# Patient Record
Sex: MALE | Race: WHITE | NOT HISPANIC OR LATINO | Employment: OTHER | URBAN - METROPOLITAN AREA
[De-identification: names, ages, dates, MRNs, and addresses within clinical notes are randomized per-mention and may not be internally consistent; named-entity substitution may affect disease eponyms.]

---

## 2018-01-01 ENCOUNTER — TELEPHONE (OUTPATIENT)
Dept: NEUROLOGY | Facility: HOSPITAL | Age: 68
End: 2018-01-01

## 2018-01-01 ENCOUNTER — CONFERENCE (OUTPATIENT)
Dept: NEUROLOGY | Facility: HOSPITAL | Age: 68
End: 2018-01-01

## 2018-01-01 ENCOUNTER — OFFICE VISIT (OUTPATIENT)
Dept: NEUROLOGY | Facility: HOSPITAL | Age: 68
End: 2018-01-01
Attending: INTERNAL MEDICINE
Payer: COMMERCIAL

## 2018-01-01 ENCOUNTER — LAB VISIT (OUTPATIENT)
Dept: LAB | Facility: HOSPITAL | Age: 68
End: 2018-01-01
Attending: INTERNAL MEDICINE
Payer: MEDICARE

## 2018-01-01 VITALS
HEART RATE: 86 BPM | WEIGHT: 269.38 LBS | SYSTOLIC BLOOD PRESSURE: 155 MMHG | DIASTOLIC BLOOD PRESSURE: 64 MMHG | HEIGHT: 72 IN | OXYGEN SATURATION: 98 % | BODY MASS INDEX: 36.49 KG/M2 | TEMPERATURE: 97 F

## 2018-01-01 DIAGNOSIS — C7A.019 MALIGNANT CARCINOID TUMOR OF SMALL INTESTINE: ICD-10-CM

## 2018-01-01 DIAGNOSIS — C7A.8 NEUROENDOCRINE CARCINOMA OF SMALL BOWEL: Primary | ICD-10-CM

## 2018-01-01 DIAGNOSIS — C7B.8 SECONDARY NEUROENDOCRINE TUMOR OF BONE: ICD-10-CM

## 2018-01-01 DIAGNOSIS — C7A.8 NEUROENDOCRINE CARCINOMA OF SMALL BOWEL: ICD-10-CM

## 2018-01-01 DIAGNOSIS — C7B.8 SECONDARY NEUROENDOCRINE TUMOR OF LIVER: ICD-10-CM

## 2018-01-01 LAB
EXT 24 HR UR METANEPHRINE: ABNORMAL
EXT 24 HR UR METANEPHRINE: NORMAL
EXT 24 HR UR NORMETANEPHRINE: ABNORMAL
EXT 24 HR UR NORMETANEPHRINE: ABNORMAL
EXT 24 HR UR NORMETANEPHRINE: NORMAL
EXT 24 HR UR NORMETANEPHRINE: NORMAL
EXT 25 HYDROXY VIT D2: ABNORMAL
EXT 25 HYDROXY VIT D2: NORMAL
EXT 25 HYDROXY VIT D3: ABNORMAL
EXT 25 HYDROXY VIT D3: NORMAL
EXT 5 HIAA 24 HR URINE: ABNORMAL
EXT 5 HIAA 24 HR URINE: NORMAL
EXT 5 HIAA BLOOD: ABNORMAL
EXT 5 HIAA BLOOD: NORMAL
EXT ACTH: ABNORMAL
EXT ACTH: NORMAL
EXT AFP: ABNORMAL
EXT AFP: NORMAL
EXT ALBUMIN: 3 G/DL
EXT ALBUMIN: ABNORMAL
EXT ALKALINE PHOSPHATASE: 175 U/L
EXT ALKALINE PHOSPHATASE: ABNORMAL
EXT ALT: 13 U/L
EXT ALT: ABNORMAL
EXT AMYLASE: ABNORMAL
EXT AMYLASE: NORMAL
EXT ANTI ISLET CELL AB: ABNORMAL
EXT ANTI ISLET CELL AB: NORMAL
EXT ANTI PARIETAL CELL AB: ABNORMAL
EXT ANTI PARIETAL CELL AB: NORMAL
EXT ANTI THYROID AB: ABNORMAL
EXT ANTI THYROID AB: NORMAL
EXT AST: 19 U/L
EXT AST: ABNORMAL
EXT BILIRUBIN DIRECT: ABNORMAL MG/DL
EXT BILIRUBIN DIRECT: NORMAL MG/DL
EXT BILIRUBIN TOTAL: 0.4 MG/DL
EXT BILIRUBIN TOTAL: ABNORMAL
EXT BK VIRUS DNA QN PCR: ABNORMAL
EXT BK VIRUS DNA QN PCR: NORMAL
EXT BUN: 18 MG/DL
EXT BUN: ABNORMAL
EXT C PEPTIDE: ABNORMAL
EXT C PEPTIDE: NORMAL
EXT CA 125: ABNORMAL
EXT CA 125: NORMAL
EXT CA 19-9: ABNORMAL
EXT CA 19-9: NORMAL
EXT CA 27-29: ABNORMAL
EXT CA 27-29: NORMAL
EXT CALCITONIN: ABNORMAL
EXT CALCITONIN: NORMAL
EXT CALCIUM: 8.6 MG/DL
EXT CALCIUM: ABNORMAL
EXT CEA: ABNORMAL
EXT CEA: NORMAL
EXT CHLORIDE: 102 MMOL/L
EXT CHLORIDE: ABNORMAL
EXT CHOLESTEROL: ABNORMAL
EXT CHOLESTEROL: NORMAL
EXT CHROMOGRANIN A: 87 NMOL/L
EXT CHROMOGRANIN A: ABNORMAL
EXT CO2: 30 MMOL/L
EXT CO2: ABNORMAL
EXT CREATININE UA: ABNORMAL
EXT CREATININE UA: NORMAL
EXT CREATININE: 1.01 MG/DL
EXT CREATININE: ABNORMAL MG/DL
EXT CYCLOSPORONE LEVEL: ABNORMAL
EXT CYCLOSPORONE LEVEL: NORMAL
EXT DOPAMINE: ABNORMAL
EXT DOPAMINE: NORMAL
EXT EBV DNA BY PCR: ABNORMAL
EXT EBV DNA BY PCR: NORMAL
EXT EPINEPHRINE: ABNORMAL
EXT EPINEPHRINE: NORMAL
EXT FOLATE: ABNORMAL
EXT FOLATE: NORMAL
EXT FREE T3: ABNORMAL
EXT FREE T3: NORMAL
EXT FREE T4: ABNORMAL
EXT FREE T4: NORMAL
EXT FSH: ABNORMAL
EXT FSH: NORMAL
EXT GASTRIN RELEASING PEPTIDE: ABNORMAL
EXT GASTRIN RELEASING PEPTIDE: ABNORMAL
EXT GASTRIN RELEASING PEPTIDE: NORMAL
EXT GASTRIN RELEASING PEPTIDE: NORMAL
EXT GASTRIN: ABNORMAL
EXT GASTRIN: NORMAL
EXT GGT: ABNORMAL
EXT GGT: NORMAL
EXT GHRELIN: ABNORMAL
EXT GHRELIN: NORMAL
EXT GLUCAGON: ABNORMAL
EXT GLUCAGON: NORMAL
EXT GLUCOSE: 305 MG/DL
EXT GLUCOSE: ABNORMAL
EXT GROWTH HORMONE: ABNORMAL
EXT GROWTH HORMONE: NORMAL
EXT HCV RNA QUANT PCR: ABNORMAL
EXT HCV RNA QUANT PCR: NORMAL
EXT HDL: ABNORMAL
EXT HDL: NORMAL
EXT HEMATOCRIT: 31 %
EXT HEMATOCRIT: ABNORMAL
EXT HEMOGLOBIN A1C: ABNORMAL
EXT HEMOGLOBIN A1C: NORMAL
EXT HEMOGLOBIN: 9.8 G/DL
EXT HEMOGLOBIN: ABNORMAL
EXT HISTAMINE 24 HR URINE: ABNORMAL
EXT HISTAMINE 24 HR URINE: NORMAL
EXT HISTAMINE: ABNORMAL
EXT HISTAMINE: NORMAL
EXT IGF-1: ABNORMAL
EXT IGF-1: NORMAL
EXT IMMUNKNOW (NON-STIMULATED): ABNORMAL
EXT IMMUNKNOW (NON-STIMULATED): NORMAL
EXT IMMUNKNOW (STIMULATED): ABNORMAL
EXT IMMUNKNOW (STIMULATED): NORMAL
EXT INR: ABNORMAL
EXT INR: NORMAL
EXT INSULIN: ABNORMAL
EXT INSULIN: NORMAL
EXT LANREOTIDE LEVEL: ABNORMAL
EXT LANREOTIDE LEVEL: NORMAL
EXT LDH, TOTAL: ABNORMAL
EXT LDH, TOTAL: NORMAL
EXT LDL CHOLESTEROL: ABNORMAL
EXT LDL CHOLESTEROL: NORMAL
EXT LIPASE: ABNORMAL
EXT LIPASE: NORMAL
EXT MAGNESIUM: ABNORMAL
EXT MAGNESIUM: NORMAL
EXT METANEPHRINE FREE PLASMA: ABNORMAL
EXT METANEPHRINE FREE PLASMA: NORMAL
EXT MOTILIN: ABNORMAL
EXT MOTILIN: NORMAL
EXT NEUROKININ A CAMB: ABNORMAL
EXT NEUROKININ A CAMB: NORMAL
EXT NEUROKININ A ISI: ABNORMAL
EXT NEUROKININ A ISI: NORMAL
EXT NEUROTENSIN: ABNORMAL
EXT NEUROTENSIN: NORMAL
EXT NOREPINEPHRINE: ABNORMAL
EXT NOREPINEPHRINE: NORMAL
EXT NORMETANEPHRINE: ABNORMAL
EXT NORMETANEPHRINE: NORMAL
EXT NSE: ABNORMAL
EXT NSE: NORMAL
EXT OCTREOTIDE LEVEL: ABNORMAL
EXT OCTREOTIDE LEVEL: NORMAL
EXT PANCREASTATIN CAMB: ABNORMAL
EXT PANCREASTATIN CAMB: NORMAL
EXT PANCREASTATIN ISI: ABNORMAL PG/ML (ref 10–135)
EXT PANCREASTATIN ISI: NORMAL
EXT PANCREATIC POLYPEPTIDE: ABNORMAL
EXT PANCREATIC POLYPEPTIDE: NORMAL
EXT PHOSPHORUS: ABNORMAL
EXT PHOSPHORUS: NORMAL
EXT PLATELETS: 173 X10^3/UL
EXT PLATELETS: ABNORMAL
EXT POTASSIUM: 4.4 MMOL/L
EXT POTASSIUM: ABNORMAL
EXT PROGRAF LEVEL: ABNORMAL
EXT PROGRAF LEVEL: NORMAL
EXT PROLACTIN: ABNORMAL
EXT PROLACTIN: NORMAL
EXT PROTEIN TOTAL: 6.9 G/DL
EXT PROTEIN TOTAL: ABNORMAL
EXT PROTEIN UA: ABNORMAL
EXT PROTEIN UA: NORMAL
EXT PT: ABNORMAL
EXT PT: NORMAL
EXT PTH, INTACT: ABNORMAL
EXT PTH, INTACT: NORMAL
EXT PTT: ABNORMAL
EXT PTT: NORMAL
EXT RAPAMUNE LEVEL: ABNORMAL
EXT RAPAMUNE LEVEL: NORMAL
EXT SEROTONIN: ABNORMAL
EXT SEROTONIN: NORMAL
EXT SODIUM: 139 MMOL/L
EXT SODIUM: ABNORMAL MMOL/L
EXT SOMATOSTATIN: ABNORMAL
EXT SOMATOSTATIN: NORMAL
EXT SUBSTANCE P: ABNORMAL
EXT SUBSTANCE P: NORMAL
EXT TRIGLYCERIDES: ABNORMAL
EXT TRIGLYCERIDES: NORMAL
EXT TRYPTASE: ABNORMAL
EXT TRYPTASE: NORMAL
EXT TSH: ABNORMAL
EXT TSH: NORMAL
EXT URIC ACID: ABNORMAL
EXT URIC ACID: NORMAL
EXT URINE AMYLASE U/HR: ABNORMAL
EXT URINE AMYLASE U/HR: NORMAL
EXT URINE AMYLASE U/L: ABNORMAL
EXT URINE AMYLASE U/L: NORMAL
EXT VASOACTIVE INTESTINAL POLYPEPTIDE: ABNORMAL
EXT VASOACTIVE INTESTINAL POLYPEPTIDE: NORMAL
EXT VITAMIN B12: ABNORMAL
EXT VITAMIN B12: NORMAL
EXT VMA 24 HR URINE: ABNORMAL
EXT VMA 24 HR URINE: NORMAL
EXT WBC: 4.2 X10^3/UL
EXT WBC: ABNORMAL
NEURON SPECIFIC ENOLASE: ABNORMAL
NEURON SPECIFIC ENOLASE: NORMAL

## 2018-01-01 PROCEDURE — 3008F BODY MASS INDEX DOCD: CPT | Mod: CPTII,,, | Performed by: INTERNAL MEDICINE

## 2018-01-01 PROCEDURE — 88323 CONSLTJ&REPRT MATRL PREP SLD: CPT | Mod: 59 | Performed by: PATHOLOGY

## 2018-01-01 PROCEDURE — 88360 TUMOR IMMUNOHISTOCHEM/MANUAL: CPT | Mod: 26,59,, | Performed by: PATHOLOGY

## 2018-01-01 PROCEDURE — 3288F FALL RISK ASSESSMENT DOCD: CPT | Mod: CPTII,,, | Performed by: INTERNAL MEDICINE

## 2018-01-01 PROCEDURE — 99214 OFFICE O/P EST MOD 30 MIN: CPT | Performed by: INTERNAL MEDICINE

## 2018-01-01 PROCEDURE — 88323 CONSLTJ&REPRT MATRL PREP SLD: CPT | Mod: 26,,, | Performed by: PATHOLOGY

## 2018-01-01 PROCEDURE — 88342 IMHCHEM/IMCYTCHM 1ST ANTB: CPT | Mod: 26,59,, | Performed by: PATHOLOGY

## 2018-01-01 PROCEDURE — 1100F PTFALLS ASSESS-DOCD GE2>/YR: CPT | Mod: CPTII,,, | Performed by: INTERNAL MEDICINE

## 2018-01-01 PROCEDURE — 88360 TUMOR IMMUNOHISTOCHEM/MANUAL: CPT | Mod: 59 | Performed by: PATHOLOGY

## 2018-01-01 PROCEDURE — 99205 OFFICE O/P NEW HI 60 MIN: CPT | Mod: ,,, | Performed by: INTERNAL MEDICINE

## 2018-01-01 RX ORDER — INSULIN ASPART 100 [IU]/ML
34 INJECTION, SOLUTION INTRAVENOUS; SUBCUTANEOUS
COMMUNITY

## 2018-01-01 RX ORDER — LANREOTIDE ACETATE 120 MG/.5ML
120 INJECTION SUBCUTANEOUS
COMMUNITY

## 2018-01-01 RX ORDER — ESZOPICLONE 2 MG/1
2 TABLET, FILM COATED ORAL NIGHTLY
COMMUNITY
End: 2018-01-01

## 2018-01-01 RX ORDER — GABAPENTIN 600 MG/1
600 TABLET ORAL 3 TIMES DAILY
COMMUNITY

## 2018-01-01 RX ORDER — FUROSEMIDE 20 MG/1
1 TABLET ORAL
COMMUNITY

## 2018-01-01 RX ORDER — LOSARTAN POTASSIUM 50 MG/1
1 TABLET ORAL
COMMUNITY

## 2018-01-01 RX ORDER — INDAPAMIDE 2.5 MG/1
2.5 TABLET ORAL DAILY
COMMUNITY

## 2018-01-01 RX ORDER — OMEPRAZOLE 20 MG/1
20 CAPSULE, DELAYED RELEASE ORAL DAILY
COMMUNITY
End: 2018-01-01

## 2018-01-01 RX ORDER — PROMETHAZINE HYDROCHLORIDE 25 MG/1
25 TABLET ORAL
COMMUNITY
Start: 2017-01-01

## 2018-01-01 RX ORDER — SUCRALFATE 1 G/1
TABLET ORAL
COMMUNITY
Start: 2017-01-01

## 2018-01-01 RX ORDER — POTASSIUM CHLORIDE 750 MG/1
2 TABLET, EXTENDED RELEASE ORAL
COMMUNITY

## 2018-05-08 NOTE — TELEPHONE ENCOUNTER
----- Message from Ileana Londono sent at 5/7/2018  3:39 PM CDT -----  Contact: self  Pt is requesting an appt due to his cancer that he was diagnosed with over 15 yrs has possible returned. Pt met Dr. Rodney at a conference in [place] and was told if he ever had and issues to contact him to discuss treatment options and would like the nurse to call him back to discuss care and Pt is willing to travel after the 26th of May. Pt is on [facility] so he's 1 hour head of .    Pt can be reached at [phone] or [phone].  
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  Pt now has progression and is being sent here for eval.  He recently had a Ga 68 PET/CT scan.  He will send all records. Cristian made with Dr. Basilio.  
[facility]

## 2018-05-17 NOTE — TELEPHONE ENCOUNTER
----- Message from Marnie Jarrett sent at 5/15/2018  8:37 AM CDT -----  Regarding: NEW  Contact: [phone]  NEW- Patient wants to cancel appointment and he will call back to reschedule once he is able to obtain all needed documents. Patients call back number is [phone] if needed.

## 2018-05-18 NOTE — TELEPHONE ENCOUNTER
----- Message from Marnie Jarrett sent at 5/15/2018  8:37 AM CDT -----  Regarding: NEW  Contact: [phone]  NEW- Patient wants to cancel appointment and he will call back to reschedule once he is able to obtain all needed documents. Patients call back number is [phone] if needed.

## 2018-07-24 NOTE — TELEPHONE ENCOUNTER
----- Message from Donna Reis sent at 7/24/2018  4:10 PM CDT -----  Contact: Fax Referral  EAW/NEW- Who Called? Patient                                      Referred by:Dr Taveras                                   Why do you need to be seen? Carcinoid tumor in liver                                     Which doctor are you requesting?Dr Basilio                                   You may contact patient back to schedule at: [phone]    Instructed patient to gather medical records, Cd's and medication list and fax or mail to us asap.

## 2018-07-25 NOTE — TELEPHONE ENCOUNTER
----- Message from Farheen Brown sent at 7/20/2018 10:37 AM CDT -----  Regarding: NEW NET  New patient - Referred by: This patient was originally set up back in May but he did not have a bx so he now has it and would like to come.  [phone] or [phone]

## 2018-07-25 NOTE — TELEPHONE ENCOUNTER
Spoke with pt. He had a new liver bx and was ready to resxhed eleno with md for consult.  Reviewed record status.  Requested tumor markers, cd of ga scan, liver bx path and surg /path report from surgery in 2006.  Pt will work on those records.  Sent request to his local md.

## 2018-10-02 PROBLEM — C7A.019 MALIGNANT CARCINOID TUMOR OF SMALL INTESTINE: Status: ACTIVE | Noted: 2018-01-01

## 2018-10-02 PROBLEM — C7B.8 SECONDARY NEUROENDOCRINE TUMOR OF BONE: Status: ACTIVE | Noted: 2018-01-01

## 2018-10-02 PROBLEM — C7B.8 SECONDARY NEUROENDOCRINE TUMOR OF LIVER: Status: ACTIVE | Noted: 2018-01-01

## 2018-10-02 NOTE — LETTER
October 2, 2018        En Hu MD  [address]  [place] [address]             [facility]  [address] [place] [address]  Phone: [phone]  Fax: [phone]   Patient: Josias Sellers   MR Number: [id number]   YOB: 1950   Date of Visit: 10/2/2018       Dear Dr. Hu:    Thank you for referring Josias Sellers to me for evaluation. Attached you will find relevant portions of my assessment and plan of care.    If you have questions, please do not hesitate to call me. I look forward to following Josias Sellers along with you.    Sincerely,      Christopher Basilio, DO, FACP            CC  No Recipients    Enclosure

## 2018-10-02 NOTE — PATIENT INSTRUCTIONS
Have Gallium scan done at home    Have ECHO with a bubble done at home, ASAP    Will track down labs    Will have your pathology/biopsy reviewed at our institution.     Information on PRRT.

## 2018-10-02 NOTE — PROGRESS NOTES
[facility]OLAN[facility]:  [facility]  A collaboration between [facility] and [facility]    PATIENT: Josias Sellers  MRN: [id number]  DATE: 10/2/2018      Diagnosis:   1. Malignant carcinoid tumor of small intestine    2. Secondary neuroendocrine tumor of liver    3. Secondary neuroendocrine tumor of bone        Chief Complaint: Consult (SB carcinoid/ref by Dr Hu)      Oncologic History:      Oncologic History Small bowel neuroendocrine tumor diagnosed 09/2003  Metastatic disease to liver at presentation  Metastatic disease to bone    Oncologic Treatment Small-bowel resection 02/2007  Laparoscopic radiofrequency ablation of the liver 08/2007  Laparoscopic radiofrequency ablation of the liver 06/2008  XRT to rib lesion 12/2017  Lanreotide 01/2018  Denosumab 05/2018    Pathology           Subjective:    Interval History: Mr. Sellers is a 67 y.o. male who is seen as an initial visit for a small intestine neuroendocrine tumor.  His history dates to 9/2003 when he sought medical attention for flank pain. He underwent a CT in 09/2003 showing a large 8.3 x 10 cm low-density mass in the right hepatic lobe and several smaller lesions within the liver.  He underwent a biopsy of the liver lesion which revealed a low-grade neuroendocrine neoplasm.  He was not felt to be a surgical candidate and was started on Sandostatin.  He was finally found to have an ileal primary which was resected laparoscopically in 02/2007.  In 08/2007 he underwent radiofrequency ablation of liver lesions which was again repeated in 06/2008.  Gallium 68 PET-CT in 10/2017 had show innumerable somatostatin positive liver metastasis along with pato disease in the portal caval space, increased uptake in the right lower quadrant mesenteric mass as well as lymph nodes in the right supraclavicular space, anterior mediastinum as well as bone metastasis.  He underwent  external beam radiation to rib lesion in 12/2017.  In 01/2018 he was started on Lanreotide and then subsequently on denosumab.    He states that he has ongoing symptoms that include flushing which has gotten worse recently.  Additionally he complains of shortness of breath and severe leg swelling also which has gotten worse.  He denies diarrhea but does admit to severe reflux.  He has been hesitant to take proton pump inhibitors.  Denies any chest pain. No other new complaints.    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    Social History:  reports that  has never smoked. He does not have any smokeless tobacco history on file.    Allergies:  Review of patient's allergies indicates:   Allergen Reactions    Contrast media Shortness Of Breath    Epinephrine      Neuroendocrine Tumor patient         Medications:  Current Outpatient Medications   Medication Sig Dispense Refill    denosumab (XGEVA) 120 mg/1.7 mL (70 mg/mL) Soln Inject 120 mg into the skin once.      gabapentin (NEURONTIN) 600 MG tablet Take 600 mg by mouth 3 (three) times daily.      indapamide (LOZOL) 2.5 MG Tab Take 2.5 mg by mouth once daily.      insulin aspart U-100  (NOVOLOG) 100 unit/mL injection Inject 34 Units into the skin 3 (three) times daily before meals.       insulin detemir U-100 (LEVEMIR) 100 unit/mL injection Inject into the skin every evening.      lanreotide (SOMATULINE DEPOT) 120 mg/0.5 mL Syrg Inject 120 mg into the skin every 30 days.        No current facility-administered medications for this visit.        Review of Systems   Constitutional: Positive for fatigue. Negative for appetite change, chills, fever and unexpected weight change.   HENT: Negative for dental problem, sinus pressure and sneezing.    Eyes: Negative for visual disturbance.   Respiratory: Positive for shortness of breath. Negative for cough, choking and chest tightness.    Cardiovascular: Positive for leg swelling. Negative for chest pain.   Gastrointestinal: Positive for abdominal pain. Negative for blood in stool, constipation, diarrhea and nausea.        Reflux   Genitourinary: Negative for difficulty urinating and dysuria.   Musculoskeletal: Negative for arthralgias and back pain.   Skin: Negative for rash and wound.   Neurological: Negative for dizziness, light-headedness and headaches.   Hematological: Negative for adenopathy. Does not bruise/bleed easily.   Psychiatric/Behavioral: Negative for sleep disturbance. The patient is not nervous/anxious.        ECOG Performance Status: 1   Objective:      Vitals:   Vitals:    10/02/18 1559   BP: (!) 155/64   Pulse: 86   Temp: 97 °F (36.1 °C)   TempSrc: Oral   SpO2: 98%   Weight: 122.2 kg (269 lb 6.4 oz)   Height: 6' (1.829 m)     BMI: Body mass index is 36.54 kg/m².    Physical Exam   Constitutional: He is oriented to person, place, and time. He appears well-developed and well-nourished. No distress.   HENT:   Head: Normocephalic and atraumatic.   Eyes: Pupils are equal, round, and reactive to light. No scleral icterus.   Neck: Normal range of motion. Neck supple.   Cardiovascular: Normal rate and regular rhythm.   Pulmonary/Chest: Effort  normal and breath sounds normal. No respiratory distress.   Abdominal: Soft. He exhibits no distension. There is tenderness.   Musculoskeletal: Normal range of motion. He exhibits edema.   Lymphadenopathy:     He has no cervical adenopathy.   Neurological: He is alert and oriented to person, place, and time. No cranial nerve deficit.   Skin: Skin is warm and dry.   Psychiatric: He has a normal mood and affect. His behavior is normal.       Laboratory Data:  Abstract on 09/04/2018   Component Date Value Ref Range Status    EXT PANCREASTATIN JACE 08/15/2018 21,710* 10 - 135 pg/ml Final    jace       Imaging:   Outside images reviewed.         Assessment:       1. Malignant carcinoid tumor of small intestine    2. Secondary neuroendocrine tumor of liver    3. Secondary neuroendocrine tumor of bone           Plan:     I have had a long discussion with Mr. Sellers and his brother today concerning his disease.  He has a widespread metastatic small intestine neuroendocrine tumor.  Review of his gallium 68 PET-CT from April 2018 shows uptake within his liver lesions as well as mesentery, lymph nodes and bones.  He is extremely symptomatic and has a very high pancreastatin.  Unfortunately, his most recent scans are approximately 6-month-old and would recommend we repeat these at this time to include a gallium 68 PET-CT.  He cannot get an MRI due to the presence of a pacemaker any can also not get a contrasted CT due to severe allergies to IV dye.  I would also recommend we check a 2D echocardiogram with a bubble study and repeat tumor markers to include a serotonin, 5 HIAA and neurokinin A.  After we receive this new images and pathology review we will discuss his case in our multidisciplinary neuroendocrine tumor board.  He will likely be a candidate for PRRT with [id number]-177.  He should remain on Lanreotide.  I have also asked him to follow up with Gastroenterology given his severe reflux if this does not resolve with the use  of a proton pump inhibitor.  Multiple questions were answered and he is agreeable with this plan.    Christopher Basilio DO, [facility]  Hematology & Oncology, [facility]  [address], [address]  [place]  [address]  ph. [phone]; [phone]  fax. [phone]    60 minutes were spent in coordination of patient's care, record review and counseling.  More than 50% of the time was face-to-face.

## 2018-11-05 NOTE — TELEPHONE ENCOUNTER
----- Message from Marnie Jarrett sent at 11/5/2018  1:29 PM CST -----  Contact: Patient  RR- Patient can't locate his PRRT folder with the patient assistance information. Patient would like the information re faxed to [phone].

## 2018-11-05 NOTE — TELEPHONE ENCOUNTER
Spoke to patient, he lost his PRRT folder (NET folder) on the plan, will mail him a new folder.  He wants to make sure the co-pay assistance for PRRT is in the folder.  Will make sure this is something I have when I send it by checking with TIERA Nolasco RN.

## 2018-11-13 NOTE — TELEPHONE ENCOUNTER
Let patient know that when we get his Ga68 we can add him on for tumor board, discuss appropriateness for PRRT then have him scheduled usually within a month.

## 2018-11-13 NOTE — TELEPHONE ENCOUNTER
----- Message from Donna Reis sent at 11/13/2018  9:53 AM CST -----  Contact: patient  RR- patient called to see if we received his report of his gallium. He indicted that he will be having the cd sent as well. Patient would like to know a time frame. Of scheduling his prrt. After his cd is received. # [phone]

## 2018-11-23 NOTE — TELEPHONE ENCOUNTER
Let patient's brother know to have patient call us if needed.  We received his Gallium scan in the mail and will get him added on to tumor board first week of December.

## 2018-11-23 NOTE — TELEPHONE ENCOUNTER
----- Message from Farheen Brown sent at 11/23/2018 12:41 PM CST -----  RR- Patients brother Christopher wanted to let you know his brother has been hospitalized with cellulitis of his leg. If you need to discuss this please call [phone].

## 2018-12-12 ENCOUNTER — TELEPHONE (OUTPATIENT)
Dept: NEUROLOGY | Facility: HOSPITAL | Age: 68
End: 2018-12-12

## 2018-12-12 NOTE — TELEPHONE ENCOUNTER
----- Message from Jana Aguiar sent at 12/12/2018  1:49 PM CST -----  Contact: Patient  RR:  Patient's brother called and requests a nurse call back, Christopher can be reached at [phone].  Thank you  abc
